# Patient Record
Sex: FEMALE | Race: WHITE | Employment: UNEMPLOYED | URBAN - METROPOLITAN AREA
[De-identification: names, ages, dates, MRNs, and addresses within clinical notes are randomized per-mention and may not be internally consistent; named-entity substitution may affect disease eponyms.]

---

## 2017-09-12 ENCOUNTER — HOSPITAL ENCOUNTER (EMERGENCY)
Facility: HOSPITAL | Age: 37
Discharge: HOME | End: 2017-09-13
Attending: EMERGENCY MEDICINE | Admitting: EMERGENCY MEDICINE

## 2017-09-12 VITALS
OXYGEN SATURATION: 97 % | RESPIRATION RATE: 16 BRPM | DIASTOLIC BLOOD PRESSURE: 56 MMHG | BODY MASS INDEX: 30.02 KG/M2 | WEIGHT: 159 LBS | SYSTOLIC BLOOD PRESSURE: 146 MMHG | HEART RATE: 88 BPM | HEIGHT: 61 IN | TEMPERATURE: 98.5 F

## 2017-09-12 DIAGNOSIS — K08.89 TOOTHACHE: Primary | ICD-10-CM

## 2017-09-13 PROCEDURE — 99282 EMERGENCY DEPT VISIT SF MDM: CPT

## 2017-09-13 RX ORDER — PENICILLIN V POTASSIUM 500 MG/1
500 TABLET ORAL 4 TIMES DAILY
Qty: 40 TABLET | Refills: 0 | Status: SHIPPED | OUTPATIENT
Start: 2017-09-13 | End: 2017-09-20

## 2017-09-13 RX ORDER — PENICILLIN V POTASSIUM 250 MG/1
500 TABLET ORAL ONCE
Status: COMPLETED | OUTPATIENT
Start: 2017-09-13 | End: 2017-09-13

## 2017-09-13 RX ADMIN — PENICILLIN V POTASIUM 500 MG: 250 TABLET ORAL at 00:35

## 2019-06-06 RX ORDER — NAPROXEN 500 MG/1
1 TABLET ORAL EVERY 12 HOURS
COMMUNITY
Start: 2015-11-10 | End: 2021-08-13 | Stop reason: ALTCHOICE

## 2019-06-06 RX ORDER — MEDROXYPROGESTERONE ACETATE 150 MG/ML
INJECTION, SUSPENSION INTRAMUSCULAR
COMMUNITY
Start: 2014-02-21

## 2019-09-08 ENCOUNTER — APPOINTMENT (EMERGENCY)
Dept: RADIOLOGY | Facility: HOSPITAL | Age: 39
End: 2019-09-08

## 2019-09-08 ENCOUNTER — HOSPITAL ENCOUNTER (EMERGENCY)
Facility: HOSPITAL | Age: 39
End: 2019-09-09
Attending: EMERGENCY MEDICINE | Admitting: EMERGENCY MEDICINE

## 2019-09-08 DIAGNOSIS — F32.9 MAJOR DEPRESSION: ICD-10-CM

## 2019-09-08 DIAGNOSIS — Z72.89 DELIBERATE SELF-CUTTING: ICD-10-CM

## 2019-09-08 DIAGNOSIS — R45.851 SUICIDAL IDEATION: Primary | ICD-10-CM

## 2019-09-08 LAB
AMPHETAMINES SERPL QL SCN: NEGATIVE
ANION GAP SERPL CALCULATED.3IONS-SCNC: 8 MMOL/L (ref 4–13)
BACTERIA UR QL AUTO: ABNORMAL /HPF
BARBITURATES UR QL: NEGATIVE
BASOPHILS # BLD AUTO: 0.05 THOUSANDS/ΜL (ref 0–0.1)
BASOPHILS NFR BLD AUTO: 1 % (ref 0–1)
BENZODIAZ UR QL: NEGATIVE
BILIRUB UR QL STRIP: NEGATIVE
BUN SERPL-MCNC: 8 MG/DL (ref 5–25)
CALCIUM SERPL-MCNC: 8.7 MG/DL (ref 8.3–10.1)
CHLORIDE SERPL-SCNC: 102 MMOL/L (ref 100–108)
CLARITY UR: CLEAR
CO2 SERPL-SCNC: 30 MMOL/L (ref 21–32)
COCAINE UR QL: NEGATIVE
COLOR UR: YELLOW
CREAT SERPL-MCNC: 0.74 MG/DL (ref 0.6–1.3)
EOSINOPHIL # BLD AUTO: 0.06 THOUSAND/ΜL (ref 0–0.61)
EOSINOPHIL NFR BLD AUTO: 1 % (ref 0–6)
ERYTHROCYTE [DISTWIDTH] IN BLOOD BY AUTOMATED COUNT: 12.9 % (ref 11.6–15.1)
EXT PREG TEST URINE: NEGATIVE
EXT. CONTROL ED NAV: NORMAL
GFR SERPL CREATININE-BSD FRML MDRD: 101 ML/MIN/1.73SQ M
GLUCOSE SERPL-MCNC: 103 MG/DL (ref 65–140)
GLUCOSE UR STRIP-MCNC: NEGATIVE MG/DL
HCT VFR BLD AUTO: 46.6 % (ref 34.8–46.1)
HGB BLD-MCNC: 15.7 G/DL (ref 11.5–15.4)
HGB UR QL STRIP.AUTO: NEGATIVE
IMM GRANULOCYTES # BLD AUTO: 0.04 THOUSAND/UL (ref 0–0.2)
IMM GRANULOCYTES NFR BLD AUTO: 0 % (ref 0–2)
KETONES UR STRIP-MCNC: NEGATIVE MG/DL
LEUKOCYTE ESTERASE UR QL STRIP: NEGATIVE
LYMPHOCYTES # BLD AUTO: 2.4 THOUSANDS/ΜL (ref 0.6–4.47)
LYMPHOCYTES NFR BLD AUTO: 25 % (ref 14–44)
MCH RBC QN AUTO: 32 PG (ref 26.8–34.3)
MCHC RBC AUTO-ENTMCNC: 33.7 G/DL (ref 31.4–37.4)
MCV RBC AUTO: 95 FL (ref 82–98)
METHADONE UR QL: NEGATIVE
MONOCYTES # BLD AUTO: 0.53 THOUSAND/ΜL (ref 0.17–1.22)
MONOCYTES NFR BLD AUTO: 6 % (ref 4–12)
NEUTROPHILS # BLD AUTO: 6.61 THOUSANDS/ΜL (ref 1.85–7.62)
NEUTS SEG NFR BLD AUTO: 67 % (ref 43–75)
NITRITE UR QL STRIP: NEGATIVE
NON-SQ EPI CELLS URNS QL MICRO: ABNORMAL /HPF
NRBC BLD AUTO-RTO: 0 /100 WBCS
OPIATES UR QL SCN: NEGATIVE
PCP UR QL: NEGATIVE
PH UR STRIP.AUTO: 7 [PH]
PLATELET # BLD AUTO: 300 THOUSANDS/UL (ref 149–390)
PMV BLD AUTO: 9.9 FL (ref 8.9–12.7)
POTASSIUM SERPL-SCNC: 4.6 MMOL/L (ref 3.5–5.3)
PROT UR STRIP-MCNC: ABNORMAL MG/DL
RBC # BLD AUTO: 4.91 MILLION/UL (ref 3.81–5.12)
RBC #/AREA URNS AUTO: ABNORMAL /HPF
SODIUM SERPL-SCNC: 140 MMOL/L (ref 136–145)
SP GR UR STRIP.AUTO: 1.01 (ref 1–1.03)
THC UR QL: POSITIVE
TSH SERPL DL<=0.05 MIU/L-ACNC: 2.21 UIU/ML (ref 0.36–3.74)
UROBILINOGEN UR QL STRIP.AUTO: 0.2 E.U./DL
WBC # BLD AUTO: 9.69 THOUSAND/UL (ref 4.31–10.16)
WBC #/AREA URNS AUTO: ABNORMAL /HPF

## 2019-09-08 PROCEDURE — 80048 BASIC METABOLIC PNL TOTAL CA: CPT | Performed by: EMERGENCY MEDICINE

## 2019-09-08 PROCEDURE — 93005 ELECTROCARDIOGRAM TRACING: CPT

## 2019-09-08 PROCEDURE — 81025 URINE PREGNANCY TEST: CPT | Performed by: EMERGENCY MEDICINE

## 2019-09-08 PROCEDURE — 80307 DRUG TEST PRSMV CHEM ANLYZR: CPT | Performed by: EMERGENCY MEDICINE

## 2019-09-08 PROCEDURE — 81001 URINALYSIS AUTO W/SCOPE: CPT | Performed by: EMERGENCY MEDICINE

## 2019-09-08 PROCEDURE — 99285 EMERGENCY DEPT VISIT HI MDM: CPT

## 2019-09-08 PROCEDURE — 36415 COLL VENOUS BLD VENIPUNCTURE: CPT | Performed by: EMERGENCY MEDICINE

## 2019-09-08 PROCEDURE — 90715 TDAP VACCINE 7 YRS/> IM: CPT | Performed by: EMERGENCY MEDICINE

## 2019-09-08 PROCEDURE — 85025 COMPLETE CBC W/AUTO DIFF WBC: CPT | Performed by: EMERGENCY MEDICINE

## 2019-09-08 PROCEDURE — 90471 IMMUNIZATION ADMIN: CPT

## 2019-09-08 PROCEDURE — 71045 X-RAY EXAM CHEST 1 VIEW: CPT

## 2019-09-08 PROCEDURE — 84443 ASSAY THYROID STIM HORMONE: CPT | Performed by: EMERGENCY MEDICINE

## 2019-09-08 RX ADMIN — TETANUS TOXOID, REDUCED DIPHTHERIA TOXOID AND ACELLULAR PERTUSSIS VACCINE, ADSORBED 0.5 ML: 5; 2.5; 8; 8; 2.5 SUSPENSION INTRAMUSCULAR at 11:43
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
## 2019-09-08 NOTE — ED NOTES
Patient laying comfortably in bed  Offering no complaints at this time  Patient remains on 1:1 observation       [address] Cohn, RN  09/08/19 9928

## 2019-09-08 NOTE — ED NOTES
Patient resting in room  Offering no complaints at this time  Patient received call from spouse  Still calm and cooperative  Patient remains on 1:1 observation        Mason Barton RN  09/08/19 2240
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
## 2019-09-08 NOTE — ED NOTES
Patient sleeping in room  Offering no complaints at this time  Patient remains on 1:1 observation        [address] Bree[address] Cohn, RN  09/08/19 1868

## 2019-09-08 NOTE — ED NOTES
Patient resting in room  Respirations even and unlabored  Patient remains on 1:1 observation        [address] Bree[address] Cohn, RN  09/08/19 4181

## 2019-09-08 NOTE — ED NOTES
Patient sleeping in room  Respirations even and unlabored  Patient remains on 1:1 observation        Natalie Domingo RN  09/08/19 9202

## 2019-09-08 NOTE — ED NOTES
14:40 - Called the following hospitals:    [facility] - No beds available  [facility] - No beds available  [facility] - No beds available, call back tomorrow  [address] [facility] stated that they will not take the pt  [facility] - No beds, call back tomorrow  [facility] - No beds, call back tomorrow after 13:00      Christa Lynch MA  Crisis Intervention Worker  09/08/19 3:06 PM

## 2019-09-08 NOTE — ED NOTES
18:00 - Pt updated that she will likely be remaining in the ED until tomorrow  She verbalized understanding  Asked if she needed anything at this time, she responded that she did not      Neena Craig MA  Crisis Intervention Worker  09/08/19 7:02 PM

## 2019-09-08 NOTE — ED NOTES
Patient ate dinner then went back to bed  Offering no complaints at this time  Patient remains on 1:1 observation        [address]ree[address] Cohn, RN  09/08/19 6209

## 2019-09-08 NOTE — ED NOTES
Patient resting in room  Offering no complaints at this time  Patient remains on 1:1 observation       Tim Dave RN  09/08/19 8246

## 2019-09-09 VITALS
RESPIRATION RATE: 20 BRPM | DIASTOLIC BLOOD PRESSURE: 70 MMHG | HEART RATE: 70 BPM | TEMPERATURE: 97.9 F | WEIGHT: 125 LBS | BODY MASS INDEX: 22.15 KG/M2 | OXYGEN SATURATION: 98 % | SYSTOLIC BLOOD PRESSURE: 142 MMHG | HEIGHT: 63 IN

## 2019-09-09 LAB
ALBUMIN SERPL BCP-MCNC: 3.4 G/DL (ref 3.5–5)
ALP SERPL-CCNC: 72 U/L (ref 46–116)
ALT SERPL W P-5'-P-CCNC: 22 U/L (ref 12–78)
AST SERPL W P-5'-P-CCNC: 16 U/L (ref 5–45)
BILIRUB DIRECT SERPL-MCNC: 0.1 MG/DL (ref 0–0.2)
BILIRUB SERPL-MCNC: 0.3 MG/DL (ref 0.2–1)
ETHANOL SERPL-MCNC: <3 MG/DL (ref 0–3)
PROT SERPL-MCNC: 6.8 G/DL (ref 6.4–8.2)

## 2019-09-09 PROCEDURE — 36415 COLL VENOUS BLD VENIPUNCTURE: CPT | Performed by: EMERGENCY MEDICINE

## 2019-09-09 PROCEDURE — 80076 HEPATIC FUNCTION PANEL: CPT | Performed by: EMERGENCY MEDICINE

## 2019-09-09 PROCEDURE — 80320 DRUG SCREEN QUANTALCOHOLS: CPT | Performed by: EMERGENCY MEDICINE

## 2019-09-09 NOTE — ED NOTES
Patient laying in bed watching TV  Patient calm and cooperative  Offers no complaints at this time  Continual observation maintained       Saul Gonzalez RN  09/09/19 2343

## 2019-09-09 NOTE — ED NOTES
Pt arrived to unit, ambulated to the bed. Dressing to Left chest clean, dry, intact. Pt has no complaints of pain. No acute distress noted.   Pt sleeping  Respirations easy and unlabored  Repositions self in bed   Remains on continuous observation     Veronica Nichols, [address]  09/09/19 2531

## 2019-09-09 NOTE — ED NOTES
Patient in bed watching TV  Offers no complaints at this time  Continual observation maintained       Baron Carpenter, ABDIRAHMAN  09/09/19 2076

## 2019-09-09 NOTE — ED NOTES
9/9/19 @ 0755:  PES called [facility]; beds available  [facility], [address] [facility] [place]: PES faxed referral to Shar Leija @ [phone]  [facility], [facility]: PES called [facility] for update, and they stated that BMP was completed instead of CMP, therefore, patient will need additional blood work  In addition, BAL was completed, and [facility] requested even though PES pointed out that patient has been in the ED since yesterday  PES informed ED MD, who ordered additional tests, and will be forwarded to Shar Leija when resulted  [facility], [address]  1315: PES sent requested ab results to Vanessa Townsend: PES called [facility]; Chelsea reports that patient has been cleared by Psychiatrist, but they are awaiting medical clearance; they will call alpa Haider MS  1500: Received call from Chelsea at [facility]:  Patient is accepted at [facility]  Patient is accepted by Dr Sangeeta Al per [facility]  Nurse report is to be called to [phone] prior to patient transfer  [facility], [address]: PES called [facility]:  Will call back with transport time  Leila Hernandez MS    Transportation is arranged with [employer] is scheduled for 2300     MS Meliza

## 2019-09-09 NOTE — ED NOTES
Patient cooperative during vital sign assessment  Asked if she needed anything and responded no  Offering no complaints at this time  Patient remains on 1:1 observation        [address] Breezy Cohn, RN  09/08/19 8448

## 2019-09-09 NOTE — ED NOTES
Patient  at the bedside to visit  Patient calm and cooperative  Continual observation maintained       Ron Ambrose RN  09/09/19 6311

## 2019-09-09 NOTE — ED NOTES
Patient still watching TV in room  Offering no complaints at this time  Patient remains on 1:1 observation       [address]ree[address] Cohn, RN  09/08/19 7511

## 2019-09-09 NOTE — ED NOTES
Patient in bed  No distress noted  Continual observation maintained       Isabelle Ortiz RN  09/09/19 3837

## 2019-09-09 NOTE — ED NOTES
Patient in bed watching TV  Offers no complaints at this time  Patient signed transfer consent  Continual observation maintained       Ricardo Ayala RN  09/09/19 4908

## 2019-09-09 NOTE — ED NOTES
Patient laying in bed watching TV  Offers no complaints at this time  Continual observation maintained       Sai Blanco RN  09/09/19 2566

## 2019-09-09 NOTE — ED NOTES
Patient sleeping in room  Respirations even and unlabored  Patient remains on 1:1 observation        [address]ree[address] Cohn, RN  09/08/19 1970

## 2019-09-09 NOTE — ED NOTES
Patient in bed, watching TV  Patient calm and cooperative  No distress noted  Continual observation maintained       Junaid Ibanez RN  09/09/19 9829

## 2019-09-09 NOTE — ED NOTES
Resting on the stretcher arouses easily  Denies wanting to hurt herself at this time       Amanda Liriano RN  09/09/19 3414

## 2019-09-09 NOTE — EMTALA/ACUTE CARE TRANSFER
[address] [facility] [address]  [place] [address]  Dept: [phone]      EMTALA TRANSFER CONSENT    NAME Patience Suzanne Rinne                                         1980                              MRN [id number]    I have been informed of my rights regarding examination, treatment, and transfer   by Dr Alla Oliveros MD    Benefits: Continuity of care    Risks: Potential for delay in receiving treatment      Transfer Request   I acknowledge that my medical condition has been evaluated and explained to me by the emergency department physician or other qualified medical person and/or my attending physician who has recommended and offered to me further medical examination and treatment  I understand the Hospital's obligation with respect to the treatment and stabilization of my emergency medical condition  I nevertheless request to be transferred  I release the Hospital, the doctor, and any other persons caring for me from all responsibility or liability for any injury or ill effects that may result from my transfer and agree to accept all responsibility for the consequences of my choice to transfer, rather than receive stabilizing treatment at the Hospital  I understand that because the transfer is my request, my insurance may not provide reimbursement for the services  The Hospital will assist and direct me and my family in how to make arrangements for transfer, but the hospital is not liable for any fees charged by the transport service  In spite of this understanding, I refuse to consent to further medical examination and treatment which has been offered to me, and request transfer to [address] [place] [address] Name, Brenden Alfaro  I authorize the performance of emergency medical procedures and treatments upon me in both transit and upon arrival at the receiving facility    Additionally, I authorize the release of any and all medical records to the receiving facility and request they be transported with me, if possible  I authorize the performance of emergency medical procedures and treatments upon me in both transit and upon arrival at the receiving facility  Additionally, I authorize the release of any and all medical records to the receiving facility and request they be transported with me, if possible  I understand that the safest mode of transportation during a medical emergency is an ambulance and that the Hospital advocates the use of this mode of transport  Risks of traveling to the receiving facility by car, including absence of medical control, life sustaining equipment, such as oxygen, and medical personnel has been explained to me and I fully understand them  (THI CORRECT BOX BELOW)  [  ]  I consent to the stated transfer and to be transported by ambulance/helicopter  [  ]  I consent to the stated transfer, but refuse transportation by ambulance and accept full responsibility for my transportation by car  I understand the risks of non-ambulance transfers and I exonerate the Hospital and its staff from any deterioration in my condition that results from this refusal     X___________________________________________    DATE  19  TIME________  Signature of patient or legally responsible individual signing on patient behalf           RELATIONSHIP TO PATIENT_________________________          Provider Certification    NAME [address] [place] [address] 1980                              MRN [id number]    A medical screening exam was performed on the above named patient  Based on the examination:    Condition Necessitating Transfer The primary encounter diagnosis was Suicidal ideation  Diagnoses of Deliberate self-cutting and Major depression were also pertinent to this visit      Patient Condition: The patient has been stabilized such that within reasonable medical probability, no material deterioration of the patient condition or the condition of the unborn child(rambo) is likely to result from the transfer    Reason for Transfer: Level of Care needed not available at this facility    Transfer Requirements: [address]   · Space available and qualified personnel available for treatment as acknowledged by    · Agreed to accept transfer and to provide appropriate medical treatment as acknowledged by       Dr Louie Fox  · Appropriate medical records of the examination and treatment of the patient are provided at the time of transfer   [address] _______  · Transfer will be performed by qualified personnel from    and appropriate transfer equipment as required, including the use of necessary and appropriate life support measures  Provider Certification: I have examined the patient and explained the following risks and benefits of being transferred/refusing transfer to the patient/family:  Risk of worsening condition, General risk, such as traffic hazards, adverse weather conditions, rough terrain or turbulence, possible failure of equipment (including vehicle or aircraft), or consequences of actions of persons outside the control of the transport personnel      Based on these reasonable risks and benefits to the patient and/or the unborn child(rambo), and based upon the information available at the time of the patients examination, I certify that the medical benefits reasonably to be expected from the provision of appropriate medical treatments at another medical facility outweigh the increasing risks, if any, to the individuals medical condition, and in the case of labor to the unborn child, from effecting the transfer      X____________________________________________ DATE 09/09/19        TIME_______      ORIGINAL - SEND TO MEDICAL RECORDS   COPY - SEND WITH PATIENT DURING TRANSFER

## 2019-09-09 NOTE — ED NOTES
Received patient while POA is on lunch break  Patient laying on stretcher watching TV  No complaints at this time   1:1 remains      Gabbie Guzman  09/09/19 3120

## 2019-09-09 NOTE — ED NOTES
Patient in bed watching TV  No distress noted  Continual observation maintained       Junaid Ibanez RN  09/09/19 9393

## 2019-09-09 NOTE — ED NOTES
Patient watching TV in bed  Still cooperative and calm  Patient remains on 1:1 observation        [address] Breezy Cohn, RN  09/08/19 2020

## 2019-09-09 NOTE — ED NOTES
Pt observed sleeping  Respirations easy and unlabored   Remains on continuous observation     [facility]  09/09/19 7500

## 2019-09-10 LAB
ATRIAL RATE: 72 BPM
P AXIS: 76 DEGREES
PR INTERVAL: 162 MS
QRS AXIS: 71 DEGREES
QRSD INTERVAL: 102 MS
QT INTERVAL: 398 MS
QTC INTERVAL: 435 MS
T WAVE AXIS: 58 DEGREES
VENTRICULAR RATE: 72 BPM

## 2019-09-10 PROCEDURE — 93010 ELECTROCARDIOGRAM REPORT: CPT | Performed by: INTERNAL MEDICINE

## 2019-09-10 NOTE — ED NOTES
Visitor at bedside  Patient remains calm and cooperative  Offers no complaints at this time  Continual observation maintained       Isabelle Ortiz RN  09/09/19 3003

## 2019-09-10 NOTE — ED NOTES
Patient is resting comfortably  Respirations easy and unlabored  Continual observation maintained       Ricardo Ayala RN  09/09/19 0598

## 2019-09-10 NOTE — ED NOTES
Patient sleeping  Respirations easy and unlabored  No distress noted  Continual observation maintained       Xavier Carlson RN  09/09/19 5196

## 2021-08-13 ENCOUNTER — PATIENT OUTREACH (OUTPATIENT)
Dept: FAMILY MEDICINE CLINIC | Facility: CLINIC | Age: 41
End: 2021-08-13

## 2021-08-13 ENCOUNTER — OFFICE VISIT (OUTPATIENT)
Dept: FAMILY MEDICINE CLINIC | Facility: CLINIC | Age: 41
End: 2021-08-13
Payer: COMMERCIAL

## 2021-08-13 VITALS
WEIGHT: 141 LBS | OXYGEN SATURATION: 99 % | TEMPERATURE: 97.3 F | HEIGHT: 62 IN | DIASTOLIC BLOOD PRESSURE: 90 MMHG | SYSTOLIC BLOOD PRESSURE: 132 MMHG | RESPIRATION RATE: 16 BRPM | BODY MASS INDEX: 25.95 KG/M2 | HEART RATE: 85 BPM

## 2021-08-13 DIAGNOSIS — F51.04 PSYCHOPHYSIOLOGICAL INSOMNIA: ICD-10-CM

## 2021-08-13 DIAGNOSIS — Z13.6 SCREENING FOR CARDIOVASCULAR CONDITION: ICD-10-CM

## 2021-08-13 DIAGNOSIS — F41.1 GENERALIZED ANXIETY DISORDER: ICD-10-CM

## 2021-08-13 DIAGNOSIS — F33.2 SEVERE EPISODE OF RECURRENT MAJOR DEPRESSIVE DISORDER, WITHOUT PSYCHOTIC FEATURES (HCC): Primary | ICD-10-CM

## 2021-08-13 DIAGNOSIS — E66.3 OVERWEIGHT WITH BODY MASS INDEX (BMI) OF 25 TO 25.9 IN ADULT: ICD-10-CM

## 2021-08-13 DIAGNOSIS — Z11.59 NEED FOR HEPATITIS C SCREENING TEST: ICD-10-CM

## 2021-08-13 PROCEDURE — 99213 OFFICE O/P EST LOW 20 MIN: CPT | Performed by: FAMILY MEDICINE

## 2021-08-13 PROCEDURE — 3008F BODY MASS INDEX DOCD: CPT | Performed by: FAMILY MEDICINE

## 2021-08-13 PROCEDURE — 3725F SCREEN DEPRESSION PERFORMED: CPT | Performed by: FAMILY MEDICINE

## 2021-08-13 RX ORDER — ESCITALOPRAM OXALATE 10 MG/1
10 TABLET ORAL DAILY
Qty: 30 TABLET | Refills: 1 | Status: SHIPPED | OUTPATIENT
Start: 2021-08-13

## 2021-08-13 RX ORDER — TRAZODONE HYDROCHLORIDE 50 MG/1
50 TABLET ORAL
Qty: 30 TABLET | Refills: 1 | Status: SHIPPED | OUTPATIENT
Start: 2021-08-13
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
## 2021-08-19 ENCOUNTER — PATIENT OUTREACH (OUTPATIENT)
Dept: FAMILY MEDICINE CLINIC | Facility: CLINIC | Age: 41
End: 2021-08-19

## 2021-08-26 ENCOUNTER — PATIENT OUTREACH (OUTPATIENT)
Dept: FAMILY MEDICINE CLINIC | Facility: CLINIC | Age: 41
End: 2021-08-26

## 2021-08-26 NOTE — PROGRESS NOTES
This case was transferred to this SW who is now covering pts at this practice  Former SW [employer] made two attempts to reach pt but did not receive a response  Unable to Reach letter was mailed to pt  SW will be available as needed

## 2021-08-26 NOTE — LETTER
[address] [facility] [address]    Re:    8/26/2021       Dear Micheal Augustin,    We tried to reach you by phone on 8/13 and 8/19  and were unfortunately unable to reach you   Should you need assistance or resources pertaining to any social needs, please contact me at [address] at  [phone]    Sincerely,       Raoul Echeverria LCSW

## 2022-07-29 ENCOUNTER — OFFICE VISIT (OUTPATIENT)
Dept: FAMILY MEDICINE CLINIC | Facility: CLINIC | Age: 42
End: 2022-07-29
Payer: COMMERCIAL

## 2022-07-29 VITALS
HEIGHT: 62 IN | DIASTOLIC BLOOD PRESSURE: 84 MMHG | TEMPERATURE: 97.7 F | RESPIRATION RATE: 22 BRPM | WEIGHT: 152.13 LBS | SYSTOLIC BLOOD PRESSURE: 128 MMHG | BODY MASS INDEX: 27.99 KG/M2 | HEART RATE: 80 BPM | OXYGEN SATURATION: 98 %

## 2022-07-29 DIAGNOSIS — S06.0X0A CONCUSSION WITHOUT LOSS OF CONSCIOUSNESS, INITIAL ENCOUNTER: Primary | ICD-10-CM

## 2022-07-29 DIAGNOSIS — Z32.02 PREGNANCY TEST NEGATIVE: ICD-10-CM

## 2022-07-29 DIAGNOSIS — N91.2 AMENORRHEA: ICD-10-CM

## 2022-07-29 LAB — SL AMB POCT URINE HCG: NEGATIVE

## 2022-07-29 PROCEDURE — 99213 OFFICE O/P EST LOW 20 MIN: CPT | Performed by: FAMILY MEDICINE

## 2022-07-29 PROCEDURE — 81025 URINE PREGNANCY TEST: CPT | Performed by: FAMILY MEDICINE

## 2022-07-29 NOTE — PROGRESS NOTES
[facility] Office visit  Assessment/Plan:     Tobey Castleman was seen today for head injury  Diagnoses and all orders for this visit:    Concussion without loss of consciousness, initial encounter  High suspicion for concussion with tenderness of the frontal scalp on palpation  No hematoma fracture palpated  No focal neurological deficits  No loss of consciousness or seizure-like activities witnessed  · Physical exam today was nonsignificant:  No cranial nerve deficits, no weakness, normal DTRs, no focal deficits  · Advised patient to rest physically and mentally over the next 2 weeks  No vigorous exercises, no watching television for prolonged period of time  · Can take Tylenol for headaches  · If patient experiences any facial numbness asymmetry, vomiting, or any neurological deficits, advised to go to the ED  · But no indication for imaging at this time  · Follow-up in about 2 weeks    Amenorrhea  Likely due to stopping Depo-Provera after Jan 2022  Cannot rule out Minnie menopausal symptoms  · Patient's  plan to go through vasectomy in the next couple of months  · She's not interested in any birth control methods, including Depo-Provera or OCP, at this visit  But she will discuss with her  and let us know if she wants to be on a birth control before her  can schedule a vasectomy  · Recommended use of condom and barrier methods in the meantime  · On f/u, if patient wants to continue Depo, plan to get another urine HCG prior to administering Depo  If patient continues to have amenorrhea and concerned about perimenopause, consider ordering workup labs  Pregnancy test negative  -     POCT urine HCG - negative       Return in about 2 weeks (around 8/12/2022) for fu concussion and birth control  Subjective:     HPI    Head trauma  · Curtain with the rosey (about 30-50 lbs) fell down on the head about 3 days ago  Hits the top of the head     · Since then, patient felt lightheadedness and persistent headaches  · Headaches are mostly in the frontal region, throbbing pain, 8/10, better with laying down and resting  · Problem falling asleep and staying asleep over the past 2 nights  · No changes in concentration  · No changes in vision  · No weakness  · No LOC or seizure like activity witnessed by her     Amenorrhea  LMP was in April 2022  Patient was previously on Depo-Provera and the last shot was back in January 2022  She is not using any form of contraception since then  Her  and patient are sexually active  No hot flashes or perimenopausal symptoms  Experienced weight gain (10lbs in 2 mths) and breast tenderness  No nausea or morning sickness  Denied any vaginal discharge or spotting  Her mom was in the 46s when she went through menopause  The following portions of the patient's history were reviewed and updated as appropriate: allergies, current medications, past family history, past medical history, past social history, past surgical history, and problem list      Review of Systems   Constitutional: Negative for chills and fever  HENT: Negative for ear pain and sore throat  Eyes: Negative for pain and visual disturbance  Respiratory: Negative for cough and shortness of breath  Cardiovascular: Negative for chest pain and palpitations  Gastrointestinal: Negative for abdominal pain and vomiting  Genitourinary: Positive for menstrual problem  Negative for dysuria, hematuria, pelvic pain, vaginal bleeding, vaginal discharge and vaginal pain  Musculoskeletal: Positive for back pain  Negative for arthralgias  Skin: Negative for color change and rash  Neurological: Positive for light-headedness and headaches  Negative for seizures and syncope  Psychiatric/Behavioral: Positive for sleep disturbance  All other systems reviewed and are negative         Objective:     /84 (BP Location: Left arm, Patient Position: Sitting, Cuff Size: Standard)   Pulse 80   Temp 97 7 °F (36 5 °C) (Tympanic)   Resp 22   Ht 5' 2" (1 575 m)   Wt 69 kg (152 lb 2 oz)   SpO2 98%   BMI 27 82 kg/m²      Physical Exam  Constitutional:       General: She is not in acute distress  Appearance: Normal appearance  HENT:      Head: Normocephalic and atraumatic  Comments: No hematoma or fracture palpated  Tenderness on the frontal head bilaterally on palpation  Eyes:      Extraocular Movements: Extraocular movements intact  Pupils: Pupils are equal, round, and reactive to light  Cardiovascular:      Rate and Rhythm: Normal rate and regular rhythm  Pulses: Normal pulses  Heart sounds: Normal heart sounds  No murmur heard  Pulmonary:      Effort: Pulmonary effort is normal  No respiratory distress  Breath sounds: Normal breath sounds  No wheezing  Abdominal:      General: Bowel sounds are normal       Palpations: Abdomen is soft  Musculoskeletal:         General: Normal range of motion  Cervical back: Normal range of motion and neck supple  No rigidity  Skin:     General: Skin is warm and dry  Neurological:      General: No focal deficit present  Mental Status: She is alert and oriented to person, place, and time  Mental status is at baseline  Cranial Nerves: No cranial nerve deficit  Sensory: No sensory deficit  Motor: No weakness  Coordination: Coordination normal       Gait: Gait normal       Deep Tendon Reflexes: Reflexes normal    Psychiatric:         Mood and Affect: Mood normal          Behavior: Behavior normal          Thought Content:  Thought content normal           ** Please Note: This note has been constructed using a voice recognition system **     [address] Vitaly Bassett MD  07/29/22  11:42 AM

## 2022-08-06 ENCOUNTER — HOSPITAL ENCOUNTER (EMERGENCY)
Facility: HOSPITAL | Age: 42
Discharge: HOME | End: 2022-08-06
Attending: EMERGENCY MEDICINE
Payer: COMMERCIAL

## 2022-08-06 VITALS
BODY MASS INDEX: 27.97 KG/M2 | SYSTOLIC BLOOD PRESSURE: 138 MMHG | HEIGHT: 62 IN | WEIGHT: 152 LBS | TEMPERATURE: 97.5 F | OXYGEN SATURATION: 100 % | RESPIRATION RATE: 22 BRPM | DIASTOLIC BLOOD PRESSURE: 86 MMHG | HEART RATE: 99 BPM

## 2022-08-06 DIAGNOSIS — F10.929 ALCOHOL INTOXICATION (HCC): Primary | ICD-10-CM

## 2022-08-06 LAB
ETHANOL SERPL-MCNC: 194 MG/DL (ref 0–3)
EXT PREG TEST URINE: NEGATIVE
EXT. CONTROL ED NAV: NORMAL

## 2022-08-06 PROCEDURE — 81025 URINE PREGNANCY TEST: CPT | Performed by: EMERGENCY MEDICINE

## 2022-08-06 PROCEDURE — 99284 EMERGENCY DEPT VISIT MOD MDM: CPT

## 2022-08-06 PROCEDURE — 82077 ASSAY SPEC XCP UR&BREATH IA: CPT | Performed by: EMERGENCY MEDICINE

## 2022-08-06 PROCEDURE — 36415 COLL VENOUS BLD VENIPUNCTURE: CPT | Performed by: EMERGENCY MEDICINE

## 2022-08-06 PROCEDURE — 99284 EMERGENCY DEPT VISIT MOD MDM: CPT | Performed by: EMERGENCY MEDICINE

## 2022-08-06 RX ORDER — ONDANSETRON 4 MG/1
4 TABLET, ORALLY DISINTEGRATING ORAL ONCE
Status: COMPLETED | OUTPATIENT
Start: 2022-08-06 | End: 2022-08-06

## 2022-08-06 RX ADMIN — ONDANSETRON 4 MG: 4 TABLET, ORALLY DISINTEGRATING ORAL at 10:18
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
## 2022-08-06 NOTE — ED NOTES
Patient currently on the phone with [facility] from the [facility] for intake assessment        Viktoriya Childress RN  08/06/22 2631

## 2022-08-06 NOTE — ED NOTES
Patient have another belongings at the security hold office  Because not fit on the lockers       Chrissy De Santiago  08/06/22 4757

## 2022-08-06 NOTE — ED NOTES
2 Wallets, $2, [place]  ID Card, Paypal card, [employer] card, chime debit,  Visa Debit card, Marijuana, sunglasses, , lighter, make up, laptop, keys, lotion, Glasses x2, slippers, sneakers, 2 cellphones, firecrackers, dress        Gayle Downey  08/06/22 1007

## 2022-08-06 NOTE — ED NOTES
Called shelter for placement update  They are still searching for a hotel for the patient, will call back with update        Larry Cespedes RN  08/06/22 [id number]

## 2025-06-23 ENCOUNTER — HOSPITAL ENCOUNTER (EMERGENCY)
Facility: HOSPITAL | Age: 45
Discharge: HOME | End: 2025-06-23
Attending: EMERGENCY MEDICINE | Admitting: EMERGENCY MEDICINE
Payer: COMMERCIAL

## 2025-06-23 VITALS
OXYGEN SATURATION: 95 % | HEART RATE: 90 BPM | TEMPERATURE: 98.4 F | BODY MASS INDEX: 26.52 KG/M2 | DIASTOLIC BLOOD PRESSURE: 56 MMHG | WEIGHT: 145 LBS | SYSTOLIC BLOOD PRESSURE: 120 MMHG | RESPIRATION RATE: 18 BRPM

## 2025-06-23 DIAGNOSIS — R19.7 DIARRHEA: Primary | ICD-10-CM

## 2025-06-23 PROCEDURE — 99284 EMERGENCY DEPT VISIT MOD MDM: CPT | Performed by: EMERGENCY MEDICINE

## 2025-06-23 PROCEDURE — 99283 EMERGENCY DEPT VISIT LOW MDM: CPT

## 2025-06-23 NOTE — Clinical Note
Iwona Pace was seen and treated in our emergency department on 6/23/2025.                Diagnosis:     Patience  may return to work on return date.    She may return on this date: 06/24/2025         If you have any questions or concerns, please don't hesitate to call.      Padmini Trejo MD    ______________________________           _______________          _______________  Hospital Representative                              Date                                Time

## 2025-06-23 NOTE — ED PROVIDER NOTES
Time reflects when diagnosis was documented in both MDM as applicable and the Disposition within this note       Time User Action Codes Description Comment    6/23/2025 10:15 AM Padmini Trejo Add [R19.7] Diarrhea           ED Disposition       ED Disposition   Discharge    Condition   Stable    Date/Time   Mon Jun 23, 2025 10:15 AM    Comment   Iwona Pace discharge to home/self care.                   Assessment & Plan       Medical Decision Making  Pt is a 44yo F who presents for work note.     Plan to discharge pt with f/u to PCP if needed. Discussed returning the ED with new or worsening of symptoms. Discussed use of over the counter medications as stated on the bottle as needed for symptoms. Pt expressed understanding of discharge instructions, return precautions, and medication instructions and is stable for discharge at this time. All questions were answered and pt was discharged without incident.                  Medications - No data to display    ED Risk Strat Scores                    No data recorded                            History of Present Illness       Chief Complaint   Patient presents with    Medical Problem     Pt reported she and her son was vomiting on Friday night after eating chicken. Didn't go to work on sat and this morning went to work and told to have Md note. Pt denies any symptoms now.        Past Medical History[1]   Past Surgical History[2]   Family History[3]   Social History[4]   E-Cigarette/Vaping    E-Cigarette Use Never User       E-Cigarette/Vaping Substances    Nicotine No     THC No     CBD No     Flavoring No     Other No     Unknown No       I have reviewed and agree with the history as documented.     Pt is a 44yo F who presents for work note.  Patient reports that 3 days ago herself and her son both had a GI related illness.  She reports abdominal discomfort, diarrhea, and nausea.  Patient reports for the past 2 days she has been having the symptoms.  Patient  reports today she is feeling better.  She reports she has solid bowel movement and is tolerating p.o.  Patient presented to work and was told that she needed a note to return to work as she had been out for the past 2 days.  Patient denying any complaints currently.          Objective       ED Triage Vitals [06/23/25 1010]   Temperature Pulse Blood Pressure Respirations SpO2 Patient Position - Orthostatic VS   98.4 °F (36.9 °C) 90 120/56 18 95 % Sitting      Temp Source Heart Rate Source BP Location FiO2 (%) Pain Score    Oral Monitor Left arm -- No Pain      Vitals      Date and Time Temp Pulse SpO2 Resp BP Pain Score FACES Pain Rating User   06/23/25 1010 98.4 °F (36.9 °C) 90 95 % 18 120/56 No Pain -- SF            Physical Exam  Vitals reviewed.   Constitutional:       General: She is not in acute distress.     Appearance: She is well-developed. She is not toxic-appearing or diaphoretic.   HENT:      Head: Normocephalic and atraumatic.      Right Ear: External ear normal.      Left Ear: External ear normal.      Nose: Nose normal.     Eyes:      Conjunctiva/sclera: Conjunctivae normal.      Pupils: Pupils are equal, round, and reactive to light.       Cardiovascular:      Rate and Rhythm: Normal rate and regular rhythm.      Heart sounds: Normal heart sounds.   Pulmonary:      Effort: Pulmonary effort is normal. No respiratory distress.      Breath sounds: Normal breath sounds.   Abdominal:      General: Abdomen is flat. Bowel sounds are normal. There is no distension.      Palpations: Abdomen is soft.      Tenderness: There is no abdominal tenderness.     Musculoskeletal:         General: Normal range of motion.      Cervical back: Normal range of motion and neck supple.     Skin:     General: Skin is warm and dry.      Capillary Refill: Capillary refill takes less than 2 seconds.     Neurological:      Mental Status: She is alert and oriented to person, place, and time.     Psychiatric:         Speech: Speech  normal.         Behavior: Behavior is cooperative.         Results Reviewed       None            No orders to display       Procedures    ED Medication and Procedure Management   Prior to Admission Medications   Prescriptions Last Dose Informant Patient Reported? Taking?   escitalopram (LEXAPRO) 10 mg tablet Not Taking  No No   Sig: Take 1 tablet (10 mg total) by mouth daily   Patient not taking: Reported on 6/23/2025   medroxyPROGESTERone (DEPO-PROVERA) 150 mg/mL injection Not Taking  Yes No   Sig: Inject into a muscle every 4 (four) months   Patient not taking: Reported on 6/23/2025   traZODone (DESYREL) 50 mg tablet Not Taking  No No   Sig: Take 1 tablet (50 mg total) by mouth daily at bedtime   Patient not taking: Reported on 6/23/2025      Facility-Administered Medications: None     Discharge Medication List as of 6/23/2025 10:16 AM        CONTINUE these medications which have NOT CHANGED    Details   escitalopram (LEXAPRO) 10 mg tablet Take 1 tablet (10 mg total) by mouth daily, Starting Fri 8/13/2021, Normal      medroxyPROGESTERone (DEPO-PROVERA) 150 mg/mL injection Inject into a muscle every 4 (four) months, Starting Fri 2/21/2014, Historical Med      traZODone (DESYREL) 50 mg tablet Take 1 tablet (50 mg total) by mouth daily at bedtime, Starting Fri 8/13/2021, Normal           No discharge procedures on file.  ED SEPSIS DOCUMENTATION   Time reflects when diagnosis was documented in both MDM as applicable and the Disposition within this note       Time User Action Codes Description Comment    6/23/2025 10:15 AM Padmini Trejo Add [R19.7] Diarrhea                      [1]   Past Medical History:  Diagnosis Date    Cannabis abuse     Dependence on nicotine from cigarettes     Depression     Major depression, single episode     Secondary hypercoagulable state (HCC)    [2] No past surgical history on file.  [3]   Family History  Problem Relation Name Age of Onset    No Known Problems Mother      Diabetes  Family      Hepatitis Family     [4]   Social History  Tobacco Use    Smoking status: Every Day     Current packs/day: 1.00     Average packs/day: 1 pack/day for 10.0 years (10.0 ttl pk-yrs)     Types: Cigarettes    Smokeless tobacco: Never   Vaping Use    Vaping status: Never Used   Substance Use Topics    Alcohol use: Yes     Comment: occasionaly per week    Drug use: Yes     Types: Marijuana        Padmini Trejo MD  06/23/25 2751

## 2025-06-23 NOTE — DISCHARGE INSTRUCTIONS
Follow-up with primary care for further care. Contact info provided below if needed.  Stay hydrated.   Return to the ED with new or worsening symptoms.

## 2025-07-10 ENCOUNTER — HOSPITAL ENCOUNTER (EMERGENCY)
Facility: HOSPITAL | Age: 45
Discharge: HOME | End: 2025-07-10
Attending: EMERGENCY MEDICINE
Payer: COMMERCIAL

## 2025-07-10 VITALS
HEIGHT: 62 IN | OXYGEN SATURATION: 98 % | HEART RATE: 92 BPM | TEMPERATURE: 99 F | RESPIRATION RATE: 15 BRPM | DIASTOLIC BLOOD PRESSURE: 64 MMHG | SYSTOLIC BLOOD PRESSURE: 128 MMHG | WEIGHT: 147.4 LBS | BODY MASS INDEX: 27.12 KG/M2

## 2025-07-10 DIAGNOSIS — R10.9 ABDOMINAL CRAMPING: ICD-10-CM

## 2025-07-10 DIAGNOSIS — R11.0 NAUSEA: Primary | ICD-10-CM

## 2025-07-10 DIAGNOSIS — R42 LIGHTHEADEDNESS: ICD-10-CM

## 2025-07-10 LAB
FLUAV AG UPPER RESP QL IA.RAPID: NEGATIVE
FLUBV AG UPPER RESP QL IA.RAPID: NEGATIVE
SARS-COV+SARS-COV-2 AG RESP QL IA.RAPID: NEGATIVE

## 2025-07-10 PROCEDURE — 87811 SARS-COV-2 COVID19 W/OPTIC: CPT | Performed by: EMERGENCY MEDICINE

## 2025-07-10 PROCEDURE — 87804 INFLUENZA ASSAY W/OPTIC: CPT | Performed by: EMERGENCY MEDICINE

## 2025-07-10 PROCEDURE — 99285 EMERGENCY DEPT VISIT HI MDM: CPT | Performed by: EMERGENCY MEDICINE

## 2025-07-10 PROCEDURE — 93005 ELECTROCARDIOGRAM TRACING: CPT

## 2025-07-10 PROCEDURE — 99284 EMERGENCY DEPT VISIT MOD MDM: CPT

## 2025-07-10 RX ORDER — DICYCLOMINE HCL 20 MG
20 TABLET ORAL 2 TIMES DAILY
Qty: 20 TABLET | Refills: 0 | Status: SHIPPED | OUTPATIENT
Start: 2025-07-10

## 2025-07-10 RX ORDER — ONDANSETRON 4 MG/1
4 TABLET, ORALLY DISINTEGRATING ORAL EVERY 6 HOURS PRN
Qty: 10 TABLET | Refills: 0 | Status: SHIPPED | OUTPATIENT
Start: 2025-07-10

## 2025-07-10 RX ORDER — ONDANSETRON 4 MG/1
4 TABLET, ORALLY DISINTEGRATING ORAL ONCE
Status: COMPLETED | OUTPATIENT
Start: 2025-07-10 | End: 2025-07-10

## 2025-07-10 RX ADMIN — ONDANSETRON 4 MG: 4 TABLET, ORALLY DISINTEGRATING ORAL at 19:33

## 2025-07-10 NOTE — ED PROVIDER NOTES
Time reflects when diagnosis was documented in both MDM as applicable and the Disposition within this note       Time User Action Codes Description Comment    7/10/2025  7:26 PM Alverto Bejarano Add [R11.0] Nausea     7/10/2025  7:26 PM Alverto Bejarano Add [R42] Lightheadedness     7/10/2025  7:29 PM Alverto Bejarano Add [R10.9] Abdominal cramping           ED Disposition       ED Disposition   Discharge    Condition   Stable    Date/Time   Thu Jul 10, 2025  7:26 PM    Comment   Kendalrosa MORRELL Walker discharge to home/self care.                   Assessment & Plan       Medical Decision Making  Patient's nausea, loose bowel movements, general malaise most consistent with a gastroenteritis.  Patient well-appearing with normal vital signs, benign abdominal examination.  No tenderness at McBurney's point.  No external signs of dehydration.  Treated with Zofran in the emergency department.  I ordered and independently interpreted an EKG which demonstrates an incomplete right bundle branch block but is otherwise normal.  Prescribed Bentyl, Zofran, instructed to continue good oral hydration, discharged with return precautions.    Amount and/or Complexity of Data Reviewed  External Data Reviewed: notes.     Details: Patient evaluated on 6/23/2025 for nausea, vomiting, was feeling better at time of evaluation.  Labs: ordered.    Risk  Prescription drug management.             Medications   ondansetron (ZOFRAN-ODT) dispersible tablet 4 mg (4 mg Oral Given 7/10/25 1933)       ED Risk Strat Scores                    No data recorded        SBIRT 22yo+      Flowsheet Row Most Recent Value   Initial Alcohol Screen: US AUDIT-C     1. How often do you have a drink containing alcohol? 3 Filed at: 07/10/2025 1859   2. How many drinks containing alcohol do you have on a typical day you are drinking?  1 Filed at: 07/10/2025 1859   3b. FEMALE Any Age, or MALE 65+: How often do you have 4 or more drinks on one occassion? 0  Filed at: 07/10/2025 1859   Audit-C Score 4 Filed at: 07/10/2025 1859   KALANI: How many times in the past year have you...    Used an illegal drug or used a prescription medication for non-medical reasons? Never Filed at: 07/10/2025 1859                            History of Present Illness       Chief Complaint   Patient presents with    Nausea     Pt reports two day hx of nausea, lightheaded hot sweats. No fevers. Yesterday evening had some abd discomfort associated with one episodes of non bloody diarrhea, small amt of loose stool today no abd pain today nausea persist. Normal Po intake today no urinary complaints. No ill contacts. No recent travel/abx use       Past Medical History[1]   Past Surgical History[2]   Family History[3]   Social History[4]   E-Cigarette/Vaping    E-Cigarette Use Never User       E-Cigarette/Vaping Substances    Nicotine No     THC No     CBD No     Flavoring No     Other No     Unknown No       I have reviewed and agree with the history as documented.     Patient is a 45-year-old female presenting for evaluation of 3 days of nausea, general malaise, intermittent crampy lower abdominal pain.  Patient denies fevers, chills.  Patient states several loose bowel movements yesterday but states only 1 small volume nonbloody nonmelanotic bowel movement this morning.  Patient denies rhinorrhea, sore throat, cough, recent travel, does state a sick contact at work a few days ago.        Review of Systems   Constitutional:  Negative for chills, fatigue and fever.   Respiratory:  Negative for cough and shortness of breath.    Gastrointestinal:  Positive for abdominal pain, diarrhea and nausea. Negative for vomiting.   Neurological:  Positive for light-headedness. Negative for seizures and weakness.   All other systems reviewed and are negative.          Objective       ED Triage Vitals [07/10/25 1855]   Temperature Pulse Blood Pressure Respirations SpO2 Patient Position - Orthostatic VS   99 °F  (37.2 °C) 92 128/64 15 98 % Sitting      Temp Source Heart Rate Source BP Location FiO2 (%) Pain Score    Oral Monitor Left arm -- No Pain      Vitals      Date and Time Temp Pulse SpO2 Resp BP Pain Score FACES Pain Rating User   07/10/25 1855 99 °F (37.2 °C) 92 98 % 15 128/64 No Pain -- [facility]            Physical Exam  Vitals and nursing note reviewed.   Constitutional:       General: She is not in acute distress.     Appearance: Normal appearance. She is not ill-appearing, toxic-appearing or diaphoretic.      Comments: Well-appearing, nontoxic, nondistressed   HENT:      Head: Normocephalic and atraumatic.      Comments: Moist mucous membranes     Right Ear: External ear normal.      Left Ear: External ear normal.     Eyes:      General:         Right eye: No discharge.         Left eye: No discharge.       Cardiovascular:      Comments: Regular rate and rhythm, no murmurs rubs or gallops.  Extremities warm and well-perfused without mottling  Pulmonary:      Effort: No respiratory distress.      Comments: No increased work of breathing.  Speaking in complete sentences.  Lungs clear to auscultation bilaterally without wheezes, rales, rhonchi.  Satting 98% on room air indicating adequate oxygenation  Abdominal:      General: There is no distension.      Tenderness: There is no abdominal tenderness.      Comments: Abdomen nondistended with normal bowel sounds, nontender without rigidity, rebound, guarding     Musculoskeletal:         General: No deformity.      Cervical back: Normal range of motion.     Skin:     Findings: No lesion or rash.     Neurological:      Mental Status: She is alert and oriented to person, place, and time. Mental status is at baseline.      Comments: Awake, alert, pleasant, interactive   Psychiatric:         Mood and Affect: Mood and affect normal.         Results Reviewed       Procedure Component Value Units Date/Time    FLU/COVID Rapid Antigen (30 min. TAT) - Preferred screening test in ED  [[id number]] Collected: 07/10/25 1934    Lab Status: In process Specimen: Nares from Nose Updated: 07/10/25 1942            No orders to display       ECG 12 Lead Documentation Only    Date/Time: 7/10/2025 7:56 PM    Performed by: Alverto Bejarano MD  Authorized by: Alverto Bejarano MD    Indications / Diagnosis:  Lightheadedness  ECG reviewed by me, the ED Provider: no    Patient location:  ED  Interpretation:     Interpretation: normal    Rate:     ECG rate:  82    ECG rate assessment: normal    Rhythm:     Rhythm: sinus rhythm    Ectopy:     Ectopy: none    QRS:     QRS axis:  Normal    QRS intervals:  Normal  Conduction:     Conduction: abnormal      Abnormal conduction: incomplete RBBB    ST segments:     ST segments:  Normal  T waves:     T waves: normal        ED Medication and Procedure Management   Prior to Admission Medications   Prescriptions Last Dose Informant Patient Reported? Taking?   escitalopram (LEXAPRO) 10 mg tablet Not Taking  No No   Sig: Take 1 tablet (10 mg total) by mouth daily   Patient not taking: Reported on 7/29/2022   medroxyPROGESTERone (DEPO-PROVERA) 150 mg/mL injection Not Taking  Yes No   Sig: Inject into a muscle every 4 (four) months   Patient not taking: Reported on 7/29/2022   traZODone (DESYREL) 50 mg tablet Not Taking  No No   Sig: Take 1 tablet (50 mg total) by mouth daily at bedtime   Patient not taking: Reported on 7/29/2022      Facility-Administered Medications: None     Patient's Medications   Discharge Prescriptions    DICYCLOMINE (BENTYL) 20 MG TABLET    Take 1 tablet (20 mg total) by mouth 2 (two) times a day       Start Date: 7/10/2025 End Date: --       Order Dose: 20 mg       Quantity: 20 tablet    Refills: 0    ONDANSETRON (ZOFRAN-ODT) 4 MG DISINTEGRATING TABLET    Take 1 tablet (4 mg total) by mouth every 6 (six) hours as needed for nausea for up to 10 doses       Start Date: 7/10/2025 End Date: --       Order Dose: 4 mg       Quantity: 10 tablet     "Refills: 0     No discharge procedures on file.  ED SEPSIS DOCUMENTATION   Time reflects when diagnosis was documented in both MDM as applicable and the Disposition within this note       Time User Action Codes Description Comment    7/10/2025  7:26 PM Alverto Bejarano Add [R11.0] Nausea     7/10/2025  7:26 PM Alverto Bejarano Add [R42] Lightheadedness     7/10/2025  7:29 PM Alverto Bejarano Add [R10.9] Abdominal cramping                    [1]   Past Medical History:  Diagnosis Date    Cannabis abuse     Dependence on nicotine from cigarettes     Depression     Major depression, single episode     Secondary hypercoagulable state (HCC)    [2] No past surgical history on file.  [3]   Family History  Problem Relation Name Age of Onset    No Known Problems Mother      Diabetes Family      Hepatitis Family     [4]   Social History  Tobacco Use    Smoking status: Every Day     Current packs/day: 1.00     Average packs/day: 1 pack/day for 10.0 years (10.0 ttl pk-yrs)     Types: Cigarettes    Smokeless tobacco: Never   Vaping Use    Vaping status: Never Used   Substance Use Topics    Alcohol use: Yes     Comment: occ- \"maybe once a week\"    Drug use: Yes     Types: Marijuana     Comment: \"two to thee times a week\"        Alverto Bejarano MD  07/10/25 1956    "

## 2025-07-10 NOTE — DISCHARGE INSTRUCTIONS
If you have any severe worsening abdominal pain, chest pain, shortness of breath, if you pass out, severe nausea and vomiting despite the Zofran and are not able to drink fluids, return to the emergency department.  Take the prescribed Zofran as needed for nausea and vomiting, Bentyl for crampy abdominal pain.  Drink lots of fluids.

## 2025-07-10 NOTE — Clinical Note
Iwona Pace was seen and treated in our emergency department on 7/10/2025.                Diagnosis:     Patience  .    She may return on this date: 07/12/2025         If you have any questions or concerns, please don't hesitate to call.      Alverto Bejarano MD    ______________________________           _______________          _______________  Hospital Representative                              Date                                Time

## 2025-07-12 LAB
ATRIAL RATE: 82 BPM
P AXIS: 78 DEGREES
PR INTERVAL: 166 MS
QRS AXIS: 80 DEGREES
QRSD INTERVAL: 98 MS
QT INTERVAL: 362 MS
QTC INTERVAL: 422 MS
T WAVE AXIS: 68 DEGREES
VENTRICULAR RATE: 82 BPM

## 2025-07-12 PROCEDURE — 93010 ELECTROCARDIOGRAM REPORT: CPT | Performed by: INTERNAL MEDICINE
